# Patient Record
Sex: MALE | Race: WHITE | NOT HISPANIC OR LATINO | Employment: FULL TIME | URBAN - METROPOLITAN AREA
[De-identification: names, ages, dates, MRNs, and addresses within clinical notes are randomized per-mention and may not be internally consistent; named-entity substitution may affect disease eponyms.]

---

## 2020-07-29 ENCOUNTER — TELEPHONE (OUTPATIENT)
Dept: UROLOGY | Facility: CLINIC | Age: 50
End: 2020-07-29

## 2020-07-29 NOTE — TELEPHONE ENCOUNTER
Attempted to contact pt. Left voicemail adv'ng to callback.    ABW    ----- Message from Joel Barrientos sent at 7/29/2020 12:28 PM CDT -----  Regarding: Return call  Type:  Patient Returning Call    Who Called:Matteo Reed Left Message for Patient:na  Does the patient know what this is regarding?:sooner appt  Would the patient rather a call back or a response via [facility]? Call back  Best Call Back Number:[phone] ([place])   Additional Information: harish

## 2020-07-30 ENCOUNTER — OFFICE VISIT (OUTPATIENT)
Dept: UROLOGY | Facility: CLINIC | Age: 50
End: 2020-07-30
Payer: COMMERCIAL

## 2020-07-30 VITALS
SYSTOLIC BLOOD PRESSURE: 132 MMHG | RESPIRATION RATE: 18 BRPM | BODY MASS INDEX: 28.63 KG/M2 | DIASTOLIC BLOOD PRESSURE: 93 MMHG | WEIGHT: 200 LBS | HEART RATE: 79 BPM | HEIGHT: 70 IN

## 2020-07-30 DIAGNOSIS — Z80.42 FAMILY HISTORY OF PROSTATE CANCER: ICD-10-CM

## 2020-07-30 DIAGNOSIS — N50.811 TESTICULAR PAIN, RIGHT: Primary | ICD-10-CM

## 2020-07-30 DIAGNOSIS — Z12.5 SCREENING FOR MALIGNANT NEOPLASM OF PROSTATE: ICD-10-CM

## 2020-07-30 DIAGNOSIS — Z80.42 FAMILY HISTORY OF PROSTATE CANCER IN FATHER: ICD-10-CM

## 2020-07-30 PROCEDURE — 99204 OFFICE O/P NEW MOD 45 MIN: CPT | Mod: S$GLB,,, | Performed by: SPECIALIST

## 2020-07-30 PROCEDURE — 3008F BODY MASS INDEX DOCD: CPT | Mod: CPTII,S$GLB,, | Performed by: SPECIALIST

## 2020-07-30 PROCEDURE — 99204 PR OFFICE/OUTPT VISIT, NEW, LEVL IV, 45-59 MIN: ICD-10-PCS | Mod: S$GLB,,, | Performed by: SPECIALIST

## 2020-07-30 PROCEDURE — 3008F PR BODY MASS INDEX (BMI) DOCUMENTED: ICD-10-PCS | Mod: CPTII,S$GLB,, | Performed by: SPECIALIST

## 2020-07-30 RX ORDER — IBUPROFEN 800 MG/1
800 TABLET ORAL 3 TIMES DAILY
Qty: 15 TABLET | Refills: 0 | Status: SHIPPED | OUTPATIENT
Start: 2020-07-30

## 2020-07-30 RX ORDER — LEVOFLOXACIN 250 MG/1
250 TABLET ORAL DAILY
Qty: 14 TABLET | Refills: 0 | Status: SHIPPED | OUTPATIENT
Start: 2020-07-30

## 2020-07-30 RX ORDER — ATORVASTATIN CALCIUM 20 MG/1
TABLET, FILM COATED ORAL
COMMUNITY
Start: 2020-05-10

## 2020-07-30 NOTE — PROGRESS NOTES
Subjective:       Patient ID: Matteo Scott is a 50 y.o. male.    Chief Complaint: New Pt, Testicle Pain (R side - Mild pain - for abt 4 wks), and Prostate Check (family h/o CAP: Father; last prostate exam was abt 3 yrs ago)      HPI:  50-year-old  here in town presents to me for a new patient visit with reports of right testicular pain.  He rates the pain as mild and reports that started about 4 weeks ago.  Does not remember a precipitating event.  He can not tell if there any activities that aggravate the pain.  He has not tried any interventions for the pain yet.  Overall over the past several weeks the pain has been improving.  Today he rates it at about a 1 on a scale 1-10.  Pain is not radiating to any other parts of his body.    He denies any urinary symptoms.  Voids to completion.  His stream is not as strong as he used to be but he is able to empty his bladder.  Has nocturia about 1-2 times a night.  Denies daily frequency or urgency.  Denies dysuria    He has a strong family history of prostate cancer.  His father was diagnosed with prostate cancer at a young age in his 60s.  Patient reports that he had a digital rectal exam about 2 years ago by another physician when he was being worked up for blood in the urine.    Past Medical History:   Past Medical History:   Diagnosis Date    HLD (hyperlipidemia)        Past Surgical Historical:   Past Surgical History:   Procedure Laterality Date    KNEE SURGERY Bilateral     SHOULDER SURGERY Left         Medications:   Medication List with Changes/Refills   Current Medications    ATORVASTATIN (LIPITOR) 20 MG TABLET            Past Social History:   Social History     Socioeconomic History    Marital status:      Spouse name: Not on file    Number of children: Not on file    Years of education: Not on file    Highest education level: Not on file   Occupational History    Not on file   Social Needs    Financial resource strain: Not on file    Food  insecurity     Worry: Not on file     Inability: Not on file    Transportation needs     Medical: Not on file     Non-medical: Not on file   Tobacco Use    Smoking status: Never Smoker   Substance and Sexual Activity    Alcohol use: Yes    Drug use: Not on file    Sexual activity: Not on file   Lifestyle    Physical activity     Days per week: Not on file     Minutes per session: Not on file    Stress: Not on file   Relationships    Social connections     Talks on phone: Not on file     Gets together: Not on file     Attends Confucianist service: Not on file     Active member of club or organization: Not on file     Attends meetings of clubs or organizations: Not on file     Relationship status: Not on file   Other Topics Concern    Not on file   Social History Narrative    Not on file       Allergies: Review of patient's allergies indicates:  No Known Allergies     Family History:   Family History   Problem Relation Age of Onset    Prostate cancer Father         Review of Systems:  Review of Systems - General ROS: negative  Psychological ROS: negative  Ophthalmic ROS: negative  ENT ROS: negative  Allergy and Immunology ROS: negative  Hematological and Lymphatic ROS: negative  Endocrine ROS: negative  Respiratory ROS: no cough, shortness of breath, or wheezing  Cardiovascular ROS: no chest pain or dyspnea on exertion  Gastrointestinal ROS: no abdominal pain, change in bowel habits, or black or bloody stools  Genito-Urinary ROS: positive for - right testicular pain  Musculoskeletal ROS: negative  Neurological ROS: no TIA or stroke symptoms  Dermatological ROS: negative     Physical Exam:  General Appearance:    Alert, cooperative, no distress, appears stated age   Head:    Normocephalic, without obvious abnormality, atraumatic   Eyes:    PERRL, conjunctiva/corneas clear, EOM's intact, fundi     benign, both eyes        Ears:    Normal TM's and external ear canals, both ears   Nose:   Nares normal, septum  midline, mucosa normal, no drainage    or sinus tenderness   Throat:   Lips, mucosa, and tongue normal; teeth and gums normal   Neck:   Supple, symmetrical, trachea midline, no adenopathy;        thyroid:  No enlargement/tenderness/nodules; no carotid    bruit or JVD   Back:     Symmetric, no curvature, ROM normal, no CVA tenderness   Lungs:     Clear to auscultation bilaterally, respirations unlabored   Chest wall:    No tenderness or deformity   Heart:    Regular rate and rhythm, S1 and S2 normal, no murmur, rub   or gallop   Abdomen:     Soft, non-tender, bowel sounds active all four quadrants,     no masses, no organomegaly   Genitalia:    Normal male without lesion, discharge or tenderness, meatus is adequate and in the normal location, scrotum is normal.  Normal testicular size is.  There was mild induration of the right epididymis, left side was normal.   Rectal:    Deferred per patient's request   Extremities:   Extremities normal, atraumatic, no cyanosis or edema   Pulses:   2+ and symmetric all extremities   Skin:   Skin color, texture, turgor normal, no rashes or lesions   Lymph nodes:   Cervical, supraclavicular, and axillary nodes normal   Neurologic:   CNII-XII intact. Normal strength, sensation and reflexes       throughout     Urinalysis:  Negative    Assessment/Plan:       50-year-old man presenting with right testicular pain for the past several weeks.    1.  Discussed some of possible etiologies of this testicular discomfort.  Together we made a decision to try some antibiotics for about 2 weeks in conjunction with anti-inflammatory medications.  2.  Patient return to clinic in about 4 weeks for interim check  3.  We will obtain blood for serum PSA today for screening the patient has refused a digital rectal exam for the moment.    Problem List Items Addressed This Visit     None      Visit Diagnoses     Testicular pain, right    -  Primary    Screening for malignant neoplasm of prostate         Relevant Orders    Prostate Specific Antigen, Diagnostic    Family history of prostate cancer        Family history of prostate cancer in father        Relevant Orders    Prostate Specific Antigen, Diagnostic

## 2020-07-31 LAB — PSA, DIAGNOSTIC: 0.45 NG/ML (ref 0–4)

## 2020-08-04 ENCOUNTER — TELEPHONE (OUTPATIENT)
Dept: UROLOGY | Facility: CLINIC | Age: 50
End: 2020-08-04

## 2020-08-04 NOTE — TELEPHONE ENCOUNTER
Attempted to contact pt. Left voicemail, adv'ng pt to callback.    ABW    ----- Message from Javier Fonseca MD sent at 7/31/2020  6:13 PM CDT -----  Call him results.  PSA of 0.45 ng/mL is good for his age.

## 2020-08-04 NOTE — TELEPHONE ENCOUNTER
----- Message from Javier Fonseca MD sent at 7/31/2020  6:13 PM CDT -----  Call him results.  PSA of 0.45 ng/mL is good for his age.

## 2023-03-07 PROBLEM — S43.431D GLENOID LABRUM TEAR, RIGHT, SUBSEQUENT ENCOUNTER: Status: ACTIVE | Noted: 2023-03-07

## 2023-03-07 PROBLEM — M75.111 PARTIAL THICKNESS TEAR OF RIGHT ROTATOR CUFF: Status: ACTIVE | Noted: 2023-03-07
